# Patient Record
Sex: MALE | Race: WHITE | NOT HISPANIC OR LATINO | Employment: OTHER | RURAL
[De-identification: names, ages, dates, MRNs, and addresses within clinical notes are randomized per-mention and may not be internally consistent; named-entity substitution may affect disease eponyms.]

---

## 2022-03-29 ENCOUNTER — OFFICE VISIT (OUTPATIENT)
Dept: CARDIOLOGY | Facility: CLINIC | Age: 72
End: 2022-03-29

## 2022-03-29 VITALS
DIASTOLIC BLOOD PRESSURE: 96 MMHG | HEIGHT: 70 IN | SYSTOLIC BLOOD PRESSURE: 143 MMHG | HEART RATE: 60 BPM | WEIGHT: 195 LBS | BODY MASS INDEX: 27.92 KG/M2

## 2022-03-29 DIAGNOSIS — I48.0 PAF (PAROXYSMAL ATRIAL FIBRILLATION): Primary | ICD-10-CM

## 2022-03-29 DIAGNOSIS — I10 HYPERTENSION, ESSENTIAL: ICD-10-CM

## 2022-03-29 DIAGNOSIS — I63.9 CRYPTOGENIC STROKE: ICD-10-CM

## 2022-03-29 PROCEDURE — 99204 OFFICE O/P NEW MOD 45 MIN: CPT | Performed by: INTERNAL MEDICINE

## 2022-03-29 RX ORDER — OLMESARTAN MEDOXOMIL 40 MG/1
40 TABLET ORAL DAILY
COMMUNITY

## 2022-03-29 RX ORDER — HYDROGEN PEROXIDE 2.65 ML/100ML
81 LIQUID ORAL; TOPICAL 2 TIMES DAILY
COMMUNITY
Start: 2022-03-23

## 2022-03-29 RX ORDER — LEVOTHYROXINE SODIUM 112 UG/1
TABLET ORAL
COMMUNITY

## 2022-03-29 RX ORDER — FAMOTIDINE 20 MG/1
TABLET, FILM COATED ORAL
COMMUNITY

## 2022-03-29 RX ORDER — TAMSULOSIN HYDROCHLORIDE 0.4 MG/1
CAPSULE ORAL
COMMUNITY

## 2022-03-29 RX ORDER — ATORVASTATIN CALCIUM 80 MG/1
TABLET, FILM COATED ORAL
COMMUNITY

## 2022-03-29 RX ORDER — OMEPRAZOLE 20 MG/1
CAPSULE, DELAYED RELEASE ORAL
COMMUNITY

## 2022-03-29 RX ORDER — METOPROLOL SUCCINATE 50 MG/1
TABLET, EXTENDED RELEASE ORAL
COMMUNITY

## 2022-03-29 NOTE — PROGRESS NOTES
Chief Complaint  Atrial Fibrillation    Subjective            Scott Corona presents to [facility] CARDIOLOGY  History of Present Illness    71-year-old white male.  He has not had cardiac problems before.  Within the past 10 days he was admitted to the hospital in [place] with weakness, gait imbalance, left-sided numbness and weakness.  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  While hospitalized he had an episode of atrial fibrillation with rapid ventricular rates which converted back to sinus rhythm.  For that he was placed on anticoagulation and beta-blocker therapy.  Since hospital discharge he has been having some left foot weakness and he is working with home physical therapy.  He denies palpitations chest pain or subjective tachycardia.  He had not had noticeable atrial fibrillation in the past.    PMH  Past Medical History:   Diagnosis Date   • Hyperlipidemia    • Hypertension    • Stroke (HCC)          SURGICALHX  History reviewed. No pertinent surgical history.     SOC  Social History     Socioeconomic History   • Marital status:    Tobacco Use   • Smoking status: Never Smoker   • Smokeless tobacco: Never Used   Vaping Use   • Vaping Use: Never used   Substance and Sexual Activity   • Alcohol use: Defer   • Drug use: Not Currently   • Sexual activity: Defer         FAMHX  Family History   Problem Relation Age of Onset   • No Known Problems Mother    • No Known Problems Father           ALLERGY  Allergies   Allergen Reactions   • Iodine Hives        MEDSCURRENT    Current Outpatient Medications:   •  atorvastatin (LIPITOR) 80 MG tablet, atorvastatin 80 mg tablet  TAKE 1 TABLET BY MOUTH ONCE DAILY, Disp: , Rfl:   •  EQ Aspirin Adult Low Dose 81 MG EC tablet, Take 81 mg by mouth 2 (Two) Times a Day., Disp: , Rfl:   •  famotidine (PEPCID) 20 MG tablet, famotidine 20 mg tablet  TAKE 2 TABLETS BY MOUTH TWICE DAILY, Disp: , Rfl:   •   "levothyroxine (SYNTHROID, LEVOTHROID) 112 MCG tablet, levothyroxine 112 mcg tablet  Take 1 tablet every day by oral route., Disp: , Rfl:   •  metoprolol succinate XL (TOPROL-XL) 50 MG 24 hr tablet, Toprol XL 50 mg tablet,extended release  Take 2 tablets every day by oral route., Disp: , Rfl:   •  olmesartan (BENICAR) 40 MG tablet, 40 mg Daily., Disp: , Rfl:   •  omeprazole (priLOSEC) 20 MG capsule, omeprazole 20 mg capsule,delayed release  TAKE 1 CAPSULE PO EVERY DAY, Disp: , Rfl:   •  rivaroxaban (XARELTO) 20 MG tablet, Xarelto 20 mg tablet  TAKE 1 TABLET BY MOUTH ONCE DAILY AT 6 PM, Disp: , Rfl:   •  tamsulosin (FLOMAX) 0.4 MG capsule 24 hr capsule, tamsulosin 0.4 mg capsule  TAKE 1 CAPSULE EVERY DAY 30 TO 60 MINUTES AFTER SUPPER FOR PROSTATE (NO MORE REFILLS UNTIL SEEN), Disp: , Rfl:       Review of Systems   Constitutional: Negative.   HENT: Negative.    Eyes: Negative.    Cardiovascular: Negative for chest pain, dyspnea on exertion and palpitations.   Respiratory: Negative.    Endocrine: Negative.    Hematologic/Lymphatic: Negative.  Does not bruise/bleed easily.   Skin: Negative.    Musculoskeletal: Negative.    Gastrointestinal: Negative.    Genitourinary: Negative.    Neurological: Positive for focal weakness.   Psychiatric/Behavioral: Negative.         Objective     /96   Pulse 60   Ht 177.8 cm (70\")   Wt 88.5 kg (195 lb)   BMI 27.98 kg/m²       General Appearance:   · well developed  · well nourished  HENT:   · oropharynx moist  · lips not cyanotic  Neck:  · thyroid not enlarged  · supple  Respiratory:  · no respiratory distress  · normal breath sounds  · no rales  Cardiovascular:  · no jugular venous distention  · regular rhythm  · apical impulse normal  · S1 normal, S2 normal  · no S3, no S4   · no murmur  · no rub, no thrill  · carotid pulses normal; no bruit  · pedal pulses normal  · lower extremity edema: none    Musculoskeletal:  · no clubbing of fingers.   · normocephalic, head " atraumatic  Skin:   · warm, dry  Psychiatric:  · judgement and insight appropriate  · normal mood and affect      Result Review :             Data reviewed: Hospital records reviewed, EKG reviewed by me from March 20 showing atrial fibrillation rate 116 with no acute ST changes.  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  Laboratory studies reviewed     Procedures         Assessment and Plan        ASSESSMENT:  Encounter Diagnoses   Name Primary?   • PAF (paroxysmal atrial fibrillation) (HCC) Yes   • Cryptogenic stroke (HCC)    • Hypertension, essential          PLAN:    1.  Yanni has paroxysmal atrial fibrillation with recent ischemic stroke.  He is on appropriate anticoagulation therapy and is clinically maintaining sinus rhythm.  2.  For his cryptogenic stroke continue aspirin, anticoagulation as well as statin therapy.  Continue ARB  3.  Hypertension stable, continue current medical therapy, he was just informed to increase his metoprolol dose by his PCP which I agree with    An echocardiogram and Holter monitor will be scheduled.    Follow-up in about 4 weeks          Patient was given instructions and counseling regarding his condition or for health maintenance advice. Please see specific information pulled into the AVS if appropriate.             SILVIA Angeles MD  3/29/2022    16:02 EDT

## 2022-05-02 DIAGNOSIS — I63.9 CRYPTOGENIC STROKE: ICD-10-CM

## 2022-05-02 DIAGNOSIS — I48.0 PAF (PAROXYSMAL ATRIAL FIBRILLATION): ICD-10-CM

## 2022-05-12 ENCOUNTER — OFFICE VISIT (OUTPATIENT)
Dept: CARDIOLOGY | Facility: CLINIC | Age: 72
End: 2022-05-12

## 2022-05-12 VITALS
HEART RATE: 65 BPM | DIASTOLIC BLOOD PRESSURE: 84 MMHG | HEIGHT: 70 IN | BODY MASS INDEX: 27.63 KG/M2 | WEIGHT: 193 LBS | SYSTOLIC BLOOD PRESSURE: 127 MMHG

## 2022-05-12 DIAGNOSIS — I63.9 ISCHEMIC STROKE: ICD-10-CM

## 2022-05-12 DIAGNOSIS — I48.0 PAF (PAROXYSMAL ATRIAL FIBRILLATION): Primary | ICD-10-CM

## 2022-05-12 DIAGNOSIS — I10 ESSENTIAL HYPERTENSION: ICD-10-CM

## 2022-05-12 DIAGNOSIS — E78.5 HYPERLIPIDEMIA LDL GOAL <70: ICD-10-CM

## 2022-05-12 PROCEDURE — 99214 OFFICE O/P EST MOD 30 MIN: CPT | Performed by: NURSE PRACTITIONER

## 2022-05-12 NOTE — PROGRESS NOTES
Chief Complaint  Paroxysmal Atrial Fibrillation (Follow up)    Subjective            Scott Corona presents to [facility] CARDIOLOGY  History of Present Illness    Mr. Corona is a 72-year-old male here today for follow-up evaluation management of paroxysmal atrial fibrillation, ischemic stroke, hypertension, and hyperlipidemia.  A couple of months ago he was admitted to [facility] with a subacute ischemic stroke, while hospitalized he had an episode of atrial fibrillation with RVR.  He converted back to sinus rhythm before discharge.  He is currently on anticoagulation and beta-blocker therapy.  He had an echocardiogram since his last office visit, this showed normal biventricular systolic function, EF 55%, grade 1 diastolic dysfunction.  He also had a 7-day Holter monitor completed, I do not have the results of this yet.    Since his last office visit, he is doing well.  He denies any significant palpitations, subjective tachycardia, chest pain, or shortness of breath.  He was having some episodes of dizziness and overall not feeling well, his diuretic was decreased and he has felt better since.  He does report to me some occasional bleeding in his urine.  He got blood work done at primary care, blood count was normal and he is not currently having bleeding.  He has been referred to urologist as well.    PM  Past Medical History:   Diagnosis Date   • Hyperlipidemia    • Hypertension    • Stroke (HCC)          SURGICALHX  History reviewed. No pertinent surgical history.     SOC  Social History     Socioeconomic History   • Marital status:    Tobacco Use   • Smoking status: Never Smoker   • Smokeless tobacco: Never Used   Vaping Use   • Vaping Use: Never used   Substance and Sexual Activity   • Alcohol use: Defer   • Drug use: Not Currently   • Sexual activity: Defer         FAMHX  Family History   Problem Relation Age of Onset   • No Known Problems Mother    • No Known Problems Father   "         ALLERGY  Allergies   Allergen Reactions   • Iodine Hives        MEDSCURRENT    Current Outpatient Medications:   •  atorvastatin (LIPITOR) 80 MG tablet, atorvastatin 80 mg tablet  TAKE 1 TABLET BY MOUTH ONCE DAILY, Disp: , Rfl:   •  EQ Aspirin Adult Low Dose 81 MG EC tablet, Take 81 mg by mouth 2 (Two) Times a Day., Disp: , Rfl:   •  famotidine (PEPCID) 20 MG tablet, famotidine 20 mg tablet  TAKE 2 TABLETS BY MOUTH TWICE DAILY, Disp: , Rfl:   •  levothyroxine (SYNTHROID, LEVOTHROID) 112 MCG tablet, levothyroxine 112 mcg tablet  Take 1 tablet every day by oral route., Disp: , Rfl:   •  metoprolol succinate XL (TOPROL-XL) 50 MG 24 hr tablet, Toprol XL 50 mg tablet,extended release  Take 2 tablets every day by oral route., Disp: , Rfl:   •  olmesartan (BENICAR) 40 MG tablet, 40 mg Daily., Disp: , Rfl:   •  omeprazole (priLOSEC) 20 MG capsule, omeprazole 20 mg capsule,delayed release  TAKE 1 CAPSULE PO EVERY DAY, Disp: , Rfl:   •  rivaroxaban (XARELTO) 20 MG tablet, Xarelto 20 mg tablet  TAKE 1 TABLET BY MOUTH ONCE DAILY AT 6 PM, Disp: , Rfl:   •  tamsulosin (FLOMAX) 0.4 MG capsule 24 hr capsule, tamsulosin 0.4 mg capsule  TAKE 1 CAPSULE EVERY DAY 30 TO 60 MINUTES AFTER SUPPER FOR PROSTATE (NO MORE REFILLS UNTIL SEEN), Disp: , Rfl:       Review of Systems   Constitutional: Negative for malaise/fatigue.   Cardiovascular: Negative for chest pain, dyspnea on exertion, irregular heartbeat, leg swelling, near-syncope and palpitations.   Hematologic/Lymphatic: Positive for bleeding problem. Bruises/bleeds easily.   Genitourinary: Positive for hematuria.   Neurological: Positive for dizziness.        Objective     /84   Pulse 65   Ht 177.8 cm (70\")   Wt 87.5 kg (193 lb)   BMI 27.69 kg/m²       General Appearance:   · well developed  · well nourished  HENT:   · oropharynx moist  · lips not cyanotic  Neck:  · thyroid not enlarged  · supple  Respiratory:  · no respiratory distress  · normal breath sounds  · no " rales  Cardiovascular:  · no jugular venous distention  · regular rhythm  · apical impulse normal  · S1 normal, S2 normal  · no S3, no S4   · no murmur  · no rub, no thrill  · carotid pulses normal; no bruit  · pedal pulses normal  · lower extremity edema: none    Musculoskeletal:  · no clubbing of fingers.   · normocephalic, head atraumatic  Skin:   · warm, dry  Psychiatric:  · judgement and insight appropriate  · normal mood and affect      Result Review :             Data reviewed: Cardiology studies Echocardiogram reviewed as above.     Procedures      Scott Corona  reports that he has never smoked. He has never used smokeless tobacco.. I have educated him on the risk of diseases from using tobacco products such as cancer, COPD and heart disease.                 Assessment and Plan        ASSESSMENT:  Encounter Diagnoses   Name Primary?   • PAF (paroxysmal atrial fibrillation) (HCC) Yes   • Ischemic stroke (HCC)    • Essential hypertension    • Hyperlipidemia LDL goal <70          PLAN:    1.  Paroxysmal atrial fibrillation-clinically maintaining sinus rhythm.  Holter monitor completed, will see A. fib burden and rates on that study once available.  Continue beta-blocker and Xarelto.  He has had some minor bleeding in his urine, I have instructed him to notify if this returns or he has any symptoms of anemia.  No other significant bleeding.  2.  Ischemic stroke-continue aspirin and statin.  3.  Essential hypertension-controlled, continue current medication regimen.  4.  Hyperlipidemia-stable on statin therapy.    Mr. Corona is agreeable to the plan of care and will follow up in 6 months unless problems arise.          Patient was given instructions and counseling regarding his condition or for health maintenance advice. Please see specific information pulled into the AVS if appropriate.           Keke Saul, APRN   5/12/2022  09:34 EDT

## 2022-05-16 DIAGNOSIS — I48.0 PAF (PAROXYSMAL ATRIAL FIBRILLATION): ICD-10-CM

## 2023-02-14 ENCOUNTER — OFFICE VISIT (OUTPATIENT)
Dept: CARDIOLOGY | Facility: CLINIC | Age: 73
End: 2023-02-14
Payer: MEDICARE

## 2023-02-14 VITALS
SYSTOLIC BLOOD PRESSURE: 121 MMHG | BODY MASS INDEX: 27 KG/M2 | HEART RATE: 63 BPM | DIASTOLIC BLOOD PRESSURE: 78 MMHG | WEIGHT: 188.6 LBS | HEIGHT: 70 IN

## 2023-02-14 DIAGNOSIS — I10 ESSENTIAL HYPERTENSION: ICD-10-CM

## 2023-02-14 DIAGNOSIS — I48.0 PAF (PAROXYSMAL ATRIAL FIBRILLATION): Primary | ICD-10-CM

## 2023-02-14 DIAGNOSIS — E78.5 HYPERLIPIDEMIA LDL GOAL <70: ICD-10-CM

## 2023-02-14 PROCEDURE — 99214 OFFICE O/P EST MOD 30 MIN: CPT | Performed by: INTERNAL MEDICINE

## 2023-02-14 NOTE — PROGRESS NOTES
Chief Complaint  PAF    Subjective            Scott Corona presents to [facility] CARDIOLOGY  History of Present Illness    Scott is here for follow-up evaluation management of paroxysmal atrial fibrillation, history of ischemic stroke, essential hypertension, mixed hyperlipidemia.  Overall he is doing well.  He denies chest pain, palpitations or excessive shortness of breath.  He works full-time.  No bleeding problems on anticoagulation.    PMH  Past Medical History:   Diagnosis Date   • Hyperlipidemia    • Hypertension    • Stroke (HCC)          SURGICALHX  History reviewed. No pertinent surgical history.     SOC  Social History     Socioeconomic History   • Marital status:    Tobacco Use   • Smoking status: Never   • Smokeless tobacco: Never   Vaping Use   • Vaping Use: Never used   Substance and Sexual Activity   • Alcohol use: Defer   • Drug use: Not Currently   • Sexual activity: Defer         FAMHX  Family History   Problem Relation Age of Onset   • No Known Problems Mother    • No Known Problems Father           ALLERGY  Allergies   Allergen Reactions   • Iodine Hives        MEDSCURRENT    Current Outpatient Medications:   •  atorvastatin (LIPITOR) 80 MG tablet, atorvastatin 80 mg tablet  TAKE 1 TABLET BY MOUTH ONCE DAILY, Disp: , Rfl:   •  EQ Aspirin Adult Low Dose 81 MG EC tablet, Take 81 mg by mouth 2 (Two) Times a Day., Disp: , Rfl:   •  famotidine (PEPCID) 20 MG tablet, famotidine 20 mg tablet  TAKE 2 TABLETS BY MOUTH TWICE DAILY, Disp: , Rfl:   •  levothyroxine (SYNTHROID, LEVOTHROID) 112 MCG tablet, levothyroxine 112 mcg tablet  Take 1 tablet every day by oral route., Disp: , Rfl:   •  metoprolol succinate XL (TOPROL-XL) 50 MG 24 hr tablet, Toprol XL 50 mg tablet,extended release  Take 2 tablets every day by oral route., Disp: , Rfl:   •  olmesartan (BENICAR) 40 MG tablet, 40 mg Daily., Disp: , Rfl:   •  omeprazole (priLOSEC) 20 MG capsule, omeprazole 20 mg capsule,delayed  "release  TAKE 1 CAPSULE PO EVERY DAY, Disp: , Rfl:   •  rivaroxaban (XARELTO) 20 MG tablet, Xarelto 20 mg tablet  TAKE 1 TABLET BY MOUTH ONCE DAILY AT 6 PM, Disp: , Rfl:   •  tamsulosin (FLOMAX) 0.4 MG capsule 24 hr capsule, tamsulosin 0.4 mg capsule  TAKE 1 CAPSULE EVERY DAY 30 TO 60 MINUTES AFTER SUPPER FOR PROSTATE (NO MORE REFILLS UNTIL SEEN), Disp: , Rfl:       Review of Systems   Cardiovascular: Negative for chest pain and palpitations.   Respiratory: Negative for shortness of breath.         Objective     /78   Pulse 63   Ht 177.8 cm (70\")   Wt 85.5 kg (188 lb 9.6 oz)   BMI 27.06 kg/m²       General Appearance:   · well developed  · well nourished  HENT:   · oropharynx moist  · lips not cyanotic  Neck:  · thyroid not enlarged  · supple  Respiratory:  · no respiratory distress  · normal breath sounds  · no rales  Cardiovascular:  · no jugular venous distention  · regular rhythm  · apical impulse normal  · S1 normal, S2 normal  · no S3, no S4   · no murmur  · no rub, no thrill  · carotid pulses normal; no bruit  · pedal pulses normal  · lower extremity edema: none    Musculoskeletal:  · no clubbing of fingers.   · normocephalic, head atraumatic  Skin:   · warm, dry  Psychiatric:  · judgement and insight appropriate  · normal mood and affect      Result Review :             Data reviewed: Primary care records reviewed, laboratory studies reviewed     Procedures                  Assessment and Plan        ASSESSMENT:  Encounter Diagnoses   Name Primary?   • PAF (paroxysmal atrial fibrillation) ([facility]) Yes   • Essential hypertension    • Hyperlipidemia LDL goal <70          PLAN:    1.  Paroxysmal atrial fibrillation-clinically maintaining sinus rhythm.  Continue current medical therapy including anticoagulation with history of ischemic stroke.  2.  Essential hypertension- controlled, continue current medical therapy  3.  Hyperlipidemia, mixed- stable, continue high potency statin therapy    Follow-up " annually unless problems arise          Patient was given instructions and counseling regarding his condition or for health maintenance advice. Please see specific information pulled into the AVS if appropriate.             C Colin Angeles MD  2/14/2023    15:00 EST